# Patient Record
Sex: MALE | Race: WHITE | Employment: FULL TIME | URBAN - NONMETROPOLITAN AREA
[De-identification: names, ages, dates, MRNs, and addresses within clinical notes are randomized per-mention and may not be internally consistent; named-entity substitution may affect disease eponyms.]

---
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
## 2022-02-13 ENCOUNTER — HOSPITAL ENCOUNTER (EMERGENCY)
Facility: HOSPITAL | Age: 22
Discharge: HOME | End: 2022-02-13
Admitting: EMERGENCY MEDICINE

## 2022-02-13 ENCOUNTER — APPOINTMENT (OUTPATIENT)
Dept: GENERAL RADIOLOGY | Facility: HOSPITAL | Age: 22
End: 2022-02-13

## 2022-02-13 VITALS
HEART RATE: 88 BPM | HEIGHT: 73 IN | BODY MASS INDEX: 22 KG/M2 | WEIGHT: 166 LBS | DIASTOLIC BLOOD PRESSURE: 71 MMHG | SYSTOLIC BLOOD PRESSURE: 134 MMHG | RESPIRATION RATE: 16 BRPM | OXYGEN SATURATION: 99 % | TEMPERATURE: 99.5 F

## 2022-02-13 DIAGNOSIS — R04.2 HEMOPTYSIS: ICD-10-CM

## 2022-02-13 DIAGNOSIS — F12.10 MARIJUANA ABUSE: ICD-10-CM

## 2022-02-13 DIAGNOSIS — R05.9 COUGH: Primary | ICD-10-CM

## 2022-02-13 LAB
ALBUMIN SERPL-MCNC: 4.4 G/DL (ref 3.5–5.2)
ALBUMIN/GLOB SERPL: 1.7 G/DL
ALP SERPL-CCNC: 69 U/L (ref 39–117)
ALT SERPL W P-5'-P-CCNC: 13 U/L (ref 1–41)
AMPHET+METHAMPHET UR QL: NEGATIVE
AMPHETAMINES UR QL: NEGATIVE
ANION GAP SERPL CALCULATED.3IONS-SCNC: 10 MMOL/L (ref 5–15)
AST SERPL-CCNC: 15 U/L (ref 1–40)
BARBITURATES UR QL SCN: NEGATIVE
BASOPHILS # BLD AUTO: 0.06 10*3/MM3 (ref 0–0.2)
BASOPHILS NFR BLD AUTO: 0.7 % (ref 0–1.5)
BENZODIAZ UR QL SCN: NEGATIVE
BILIRUB SERPL-MCNC: 0.3 MG/DL (ref 0–1.2)
BUN SERPL-MCNC: 9 MG/DL (ref 6–20)
BUN/CREAT SERPL: 11.4 (ref 7–25)
BUPRENORPHINE SERPL-MCNC: NEGATIVE NG/ML
CALCIUM SPEC-SCNC: 9.1 MG/DL (ref 8.6–10.5)
CANNABINOIDS SERPL QL: POSITIVE
CHLORIDE SERPL-SCNC: 104 MMOL/L (ref 98–107)
CO2 SERPL-SCNC: 26 MMOL/L (ref 22–29)
COCAINE UR QL: NEGATIVE
CREAT SERPL-MCNC: 0.79 MG/DL (ref 0.76–1.27)
DEPRECATED RDW RBC AUTO: 39.2 FL (ref 37–54)
EOSINOPHIL # BLD AUTO: 0.36 10*3/MM3 (ref 0–0.4)
EOSINOPHIL NFR BLD AUTO: 3.9 % (ref 0.3–6.2)
ERYTHROCYTE [DISTWIDTH] IN BLOOD BY AUTOMATED COUNT: 12.3 % (ref 12.3–15.4)
GFR SERPL CREATININE-BSD FRML MDRD: 125 ML/MIN/1.73
GLOBULIN UR ELPH-MCNC: 2.6 GM/DL
GLUCOSE SERPL-MCNC: 89 MG/DL (ref 65–99)
HCT VFR BLD AUTO: 45.1 % (ref 37.5–51)
HGB BLD-MCNC: 15.3 G/DL (ref 13–17.7)
IMM GRANULOCYTES # BLD AUTO: 0.04 10*3/MM3 (ref 0–0.05)
IMM GRANULOCYTES NFR BLD AUTO: 0.4 % (ref 0–0.5)
INR PPP: 1.08 (ref 0.91–1.09)
LYMPHOCYTES # BLD AUTO: 1.77 10*3/MM3 (ref 0.7–3.1)
LYMPHOCYTES NFR BLD AUTO: 19.3 % (ref 19.6–45.3)
MCH RBC QN AUTO: 29.6 PG (ref 26.6–33)
MCHC RBC AUTO-ENTMCNC: 33.9 G/DL (ref 31.5–35.7)
MCV RBC AUTO: 87.2 FL (ref 79–97)
METHADONE UR QL SCN: NEGATIVE
MONOCYTES # BLD AUTO: 0.55 10*3/MM3 (ref 0.1–0.9)
MONOCYTES NFR BLD AUTO: 6 % (ref 5–12)
NEUTROPHILS NFR BLD AUTO: 6.39 10*3/MM3 (ref 1.7–7)
NEUTROPHILS NFR BLD AUTO: 69.7 % (ref 42.7–76)
NRBC BLD AUTO-RTO: 0 /100 WBC (ref 0–0.2)
OPIATES UR QL: NEGATIVE
OXYCODONE UR QL SCN: NEGATIVE
PCP UR QL SCN: NEGATIVE
PLATELET # BLD AUTO: 226 10*3/MM3 (ref 140–450)
PMV BLD AUTO: 10.6 FL (ref 6–12)
POTASSIUM SERPL-SCNC: 3.8 MMOL/L (ref 3.5–5.2)
PROPOXYPH UR QL: NEGATIVE
PROT SERPL-MCNC: 7 G/DL (ref 6–8.5)
PROTHROMBIN TIME: 13.6 SECONDS (ref 11.9–14.6)
RBC # BLD AUTO: 5.17 10*6/MM3 (ref 4.14–5.8)
SARS-COV-2 RNA PNL SPEC NAA+PROBE: NOT DETECTED
SODIUM SERPL-SCNC: 140 MMOL/L (ref 136–145)
TRICYCLICS UR QL SCN: NEGATIVE
WBC NRBC COR # BLD: 9.17 10*3/MM3 (ref 3.4–10.8)

## 2022-02-13 PROCEDURE — 99283 EMERGENCY DEPT VISIT LOW MDM: CPT

## 2022-02-13 PROCEDURE — 85025 COMPLETE CBC W/AUTO DIFF WBC: CPT | Performed by: NURSE PRACTITIONER

## 2022-02-13 PROCEDURE — 87635 SARS-COV-2 COVID-19 AMP PRB: CPT | Performed by: NURSE PRACTITIONER

## 2022-02-13 PROCEDURE — 80306 DRUG TEST PRSMV INSTRMNT: CPT | Performed by: NURSE PRACTITIONER

## 2022-02-13 PROCEDURE — 80053 COMPREHEN METABOLIC PANEL: CPT | Performed by: NURSE PRACTITIONER

## 2022-02-13 PROCEDURE — 85610 PROTHROMBIN TIME: CPT | Performed by: NURSE PRACTITIONER

## 2022-02-13 PROCEDURE — 71045 X-RAY EXAM CHEST 1 VIEW: CPT

## 2022-02-13 RX ORDER — SODIUM CHLORIDE 0.9 % (FLUSH) 0.9 %
10 SYRINGE (ML) INJECTION AS NEEDED
Status: DISCONTINUED | OUTPATIENT
Start: 2022-02-13 | End: 2022-02-13 | Stop reason: HOSPADM

## 2022-02-13 NOTE — DISCHARGE INSTRUCTIONS
Return to ER if symptoms worsen   Stop smoking marijuana and vaping  Follow up with one of the [facility] physician groups below to setup primary care. If you have trouble making an appointment, please call the [facility] Nurse Line at [phone]    Dr. Rae Morelos DO, Dr. Jenelle Oleary DO, and ANTONIO Zelaya  [facility] Primary Care  [address], [place], [address] [phone]    Dr. Loy Dodge MD  [facility] Internal Medicine - [address], [place], [place] [address] [phone]    Dr. David Stanford DO, Dr. Elmo Hernandez DO,  ANTONIO Stahl, and ANTONIO Cruz  [facility] Family & Internal Medicine - [address], [place], [place] [address] [phone]     Dr. Sabine Lemus MD, and ANTONIO Espana  [facility] Family Medicine [address], [place], [place] [address] [phone]    Dr. Robert Johnson MD and Dr. Sanjiv Obregon MD  [facility]  [address] [place] [address], [address]  [phone]    Dr. Joe Reid MD  [facility] Family Medicine [facility]  [address] [facility] [address], [address], [place], [place], [address] [phone]    Dr. Ronni Ricardo MD  [facility] Family Medicine [facility]  [address], [place], [address] [phone]

## 2022-02-13 NOTE — ED PROVIDER NOTES
Subjective   Patient is a 21-year-old white male presents to emergency department with complaints of cough with some hemoptysis that started today.  He states his been coughing up dark brown sputum for the past month.  He states today he was coughing and noticed his sputum had blood-tinged in the sputum.  He smokes marijuana at least 3-7 times a day.  States he does not smoke cigarettes.  He states he does vape occasionally.  He denies any chest pain or shortness of breath.  No nausea or vomiting.  No fever or chills.  No weight loss.  He states he only coughed up blood-tinged sputum once.      History provided by:  Patient   used: No        Review of Systems   Constitutional: Negative.    HENT: Negative.    Eyes: Negative.    Respiratory:        Patient is a 21-year-old white male presents to emergency department with complaints of cough with some hemoptysis that started today.  He states his been coughing up dark brown sputum for the past month.  He states today he was coughing and noticed his sputum had blood-tinged in the sputum.  He smokes marijuana at least 3-7 times a day.  States he does not smoke cigarettes.  He states he does vape occasionally.  He denies any chest pain or shortness of breath.  No nausea or vomiting.  No fever or chills.  No weight loss.  He states he only coughed up blood-tinged sputum once.     Cardiovascular: Negative.    Gastrointestinal: Negative.    Endocrine: Negative.    Genitourinary: Negative.    Musculoskeletal: Negative.    Skin: Negative.    Allergic/Immunologic: Negative.    Neurological: Negative.    Hematological: Negative.    Psychiatric/Behavioral: Negative.    All other systems reviewed and are negative.      History reviewed. No pertinent past medical history.    No Known Allergies    History reviewed. No pertinent surgical history.    History reviewed. No pertinent family history.    Social History     Socioeconomic History   • Marital status:  "Single   Tobacco Use   • Smoking status: Never Smoker   • Smokeless tobacco: Never Used   Substance and Sexual Activity   • Alcohol use: Yes   • Drug use: Yes     Types: Marijuana       Prior to Admission medications    Not on File       /71 (BP Location: Right arm, Patient Position: Sitting)   Pulse 88   Temp 99.5 °F (37.5 °C) (Oral)   Resp 16   Ht 184.2 cm (72.5\")   Wt 75.3 kg (166 lb)   SpO2 99%   BMI 22.20 kg/m²     Objective   Physical Exam  Vitals and nursing note reviewed.   Constitutional:       Appearance: He is well-developed.      Comments: Respirations are even and unlabored.  There is no acute distress.  Patient is nontoxic-appearing.   HENT:      Head: Normocephalic and atraumatic.   Eyes:      Conjunctiva/sclera: Conjunctivae normal.      Pupils: Pupils are equal, round, and reactive to light.   Cardiovascular:      Rate and Rhythm: Normal rate and regular rhythm.      Heart sounds: Normal heart sounds.   Pulmonary:      Effort: Pulmonary effort is normal.      Breath sounds: Normal breath sounds.      Comments: Lungs are clear to auscultation.  Abdominal:      General: Bowel sounds are normal.      Palpations: Abdomen is soft.   Musculoskeletal:         General: Normal range of motion.      Cervical back: Normal range of motion and neck supple.   Skin:     General: Skin is warm and dry.   Neurological:      Mental Status: He is alert and oriented to person, place, and time.      Deep Tendon Reflexes: Reflexes are normal and symmetric.   Psychiatric:         Behavior: Behavior normal.         Thought Content: Thought content normal.         Judgment: Judgment normal.         Procedures         Lab Results (last 24 hours)     Procedure Component Value Units Date/Time    CBC & Differential [[id number]]  (Abnormal) Collected: 02/13/22 1600    Specimen: Blood Updated: 02/13/22 1616    Narrative:      The following orders were created for panel order CBC & Differential.  Procedure               "                 Abnormality         Status                     ---------                               -----------         ------                     CBC Auto Differential[[id number]]        Abnormal            Final result                 Please view results for these tests on the individual orders.    Comprehensive Metabolic Panel [[id number]] Collected: 02/13/22 1600    Specimen: Blood Updated: 02/13/22 1633     Glucose 89 mg/dL      BUN 9 mg/dL      Creatinine 0.79 mg/dL      Sodium 140 mmol/L      Potassium 3.8 mmol/L      Chloride 104 mmol/L      CO2 26.0 mmol/L      Calcium 9.1 mg/dL      Total Protein 7.0 g/dL      Albumin 4.40 g/dL      ALT (SGPT) 13 U/L      AST (SGOT) 15 U/L      Alkaline Phosphatase 69 U/L      Total Bilirubin 0.3 mg/dL      eGFR Non African Amer 125 mL/min/1.73      Globulin 2.6 gm/dL      A/G Ratio 1.7 g/dL      BUN/Creatinine Ratio 11.4     Anion Gap 10.0 mmol/L     Narrative:      GFR Normal >60  Chronic Kidney Disease <60  Kidney Failure <15      Protime-INR [[id number]]  (Normal) Collected: 02/13/22 1600    Specimen: Blood Updated: 02/13/22 1622     Protime 13.6 Seconds      INR 1.08    Urine Drug Screen - Urine, Clean Catch [[id number]]  (Abnormal) Collected: 02/13/22 1600    Specimen: Urine, Clean Catch Updated: 02/13/22 1642     THC, Screen, Urine Positive     Phencyclidine (PCP), Urine Negative     Cocaine Screen, Urine Negative     Methamphetamine, Ur Negative     Opiate Screen Negative     Amphetamine Screen, Urine Negative     Benzodiazepine Screen, Urine Negative     Tricyclic Antidepressants Screen Negative     Methadone Screen, Urine Negative     Barbiturates Screen, Urine Negative     Oxycodone Screen, Urine Negative     Propoxyphene Screen Negative     Buprenorphine, Screen, Urine Negative    Narrative:      Cutoff For Drugs Screened:    Amphetamines               500 ng/ml  Barbiturates               200 ng/ml  Benzodiazepines            150 ng/ml  Cocaine                     150 ng/ml  Methadone                  200 ng/ml  Opiates                    100 ng/ml  Phencyclidine               25 ng/ml  THC                            50 ng/ml  Methamphetamine            500 ng/ml  Tricyclic Antidepressants  300 ng/ml  Oxycodone                  100 ng/ml  Propoxyphene               300 ng/ml  Buprenorphine               10 ng/ml    The normal value for all drugs tested is negative. This report includes unconfirmed screening results, with the cutoff values listed, to be used for medical treatment purposes only.  Unconfirmed results must not be used for non-medical purposes such as employment or legal testing.  Clinical consideration should be applied to any drug of abuse test, particularly when unconfirmed results are used.      COVID PRE-OP / PRE-PROCEDURE SCREENING ORDER (NO ISOLATION) - Swab, Nasal Cavity [[id number]]  (Normal) Collected: 02/13/22 1600    Specimen: Swab from Nasal Cavity Updated: 02/13/22 1739    Narrative:      The following orders were created for panel order COVID PRE-OP / PRE-PROCEDURE SCREENING ORDER (NO ISOLATION) - Swab, Nasal Cavity.  Procedure                               Abnormality         Status                     ---------                               -----------         ------                     COVID-19,[facility]...[[id number]]  Normal              Final result                 Please view results for these tests on the individual orders.    CBC Auto Differential [[id number]]  (Abnormal) Collected: 02/13/22 1600    Specimen: Blood Updated: 02/13/22 1616     WBC 9.17 10*3/mm3      RBC 5.17 10*6/mm3      Hemoglobin 15.3 g/dL      Hematocrit 45.1 %      MCV 87.2 fL      MCH 29.6 pg      MCHC 33.9 g/dL      RDW 12.3 %      RDW-SD 39.2 fl      MPV 10.6 fL      Platelets 226 10*3/mm3      Neutrophil % 69.7 %      Lymphocyte % 19.3 %      Monocyte % 6.0 %      Eosinophil % 3.9 %      Basophil % 0.7 %      Immature Grans % 0.4 %      Neutrophils, Absolute  6.39 10*3/mm3      Lymphocytes, Absolute 1.77 10*3/mm3      Monocytes, Absolute 0.55 10*3/mm3      Eosinophils, Absolute 0.36 10*3/mm3      Basophils, Absolute 0.06 10*3/mm3      Immature Grans, Absolute 0.04 10*3/mm3      nRBC 0.0 /100 WBC     COVID-19,[facility] IN-HOUSE,Nasal Swab No Transport Media 3-4 HR TAT - Swab, Nasal Cavity [[id number]]  (Normal) Collected: 02/13/22 1600    Specimen: Swab from Nasal Cavity Updated: 02/13/22 1739     COVID19 Not Detected    Narrative:      Fact sheet for providers: https://www.fda.gov/media/822886/download     Fact sheet for patients: https://www.[employer].com.gov/media/557723/download    Test performed by PCR.    Consider negative results in combination with clinical observations, patient history, and epidemiological information.          XR Chest 1 View   Final Result       No acute findings.   This report was finalized on 02/13/2022 16:11 by Dr. Ramirez Doss MD.          ED Course  ED Course as of 02/14/22 1350   Sun Feb 13, 2022   1748 Patient has had no hemoptysis while he is in the emergency department.  His laboratory studies are unremarkable.  Chest x-ray is negative for any acute pneumonia or abnormal findings.  Covid 19 swab is negative.  As noted earlier the patient smokes marijuana about 7 times a day.  He also vapes as well.  Advised the patient most likely this is the reason for his hemoptysis and he needs to decrease his smoking.  He does not have a primary care provider.  Will provide him a list of primary care providers to follow-up with.  Advised him to return if he has shortness of breath, fever, chest pain.  Patient will be discharged shortly in stable condition. [CW]      ED Course User Index  [CW] Yohana Jones, ANTONIO          MDM  Number of Diagnoses or Management Options  Cough: minor  Hemoptysis: minor  Marijuana abuse: minor     Amount and/or Complexity of Data Reviewed  Clinical lab tests: ordered and reviewed  Tests in the radiology section of CPT®:  ordered and reviewed    Patient Progress  Patient progress: stable      Final diagnoses:   Cough   Marijuana abuse   Hemoptysis          Yohana Jones, APRN  02/14/22 8506

## 2024-03-19 ENCOUNTER — HOSPITAL ENCOUNTER (EMERGENCY)
Facility: HOSPITAL | Age: 24
Discharge: HOME | End: 2024-03-19
Attending: STUDENT IN AN ORGANIZED HEALTH CARE EDUCATION/TRAINING PROGRAM | Admitting: STUDENT IN AN ORGANIZED HEALTH CARE EDUCATION/TRAINING PROGRAM
Payer: COMMERCIAL

## 2024-03-19 VITALS
RESPIRATION RATE: 16 BRPM | SYSTOLIC BLOOD PRESSURE: 132 MMHG | BODY MASS INDEX: 23.05 KG/M2 | TEMPERATURE: 97.8 F | HEIGHT: 70 IN | WEIGHT: 161 LBS | OXYGEN SATURATION: 99 % | DIASTOLIC BLOOD PRESSURE: 80 MMHG | HEART RATE: 88 BPM

## 2024-03-19 DIAGNOSIS — R05.9 COUGH, UNSPECIFIED TYPE: Primary | ICD-10-CM

## 2024-03-19 LAB
FLUAV RNA RESP QL NAA+PROBE: NOT DETECTED
FLUBV RNA RESP QL NAA+PROBE: NOT DETECTED
SARS-COV-2 RNA RESP QL NAA+PROBE: NOT DETECTED

## 2024-03-19 PROCEDURE — 99283 EMERGENCY DEPT VISIT LOW MDM: CPT

## 2024-03-19 PROCEDURE — 87636 SARSCOV2 & INF A&B AMP PRB: CPT | Performed by: STUDENT IN AN ORGANIZED HEALTH CARE EDUCATION/TRAINING PROGRAM

## 2024-03-19 RX ORDER — OXYMETAZOLINE HYDROCHLORIDE 0.05 G/100ML
2 SPRAY NASAL 2 TIMES DAILY
Qty: 6 ML | Refills: 0 | Status: SHIPPED | OUTPATIENT
Start: 2024-03-19 | End: 2024-03-22

## 2024-03-19 NOTE — DISCHARGE INSTRUCTIONS
Your swabs today were normal.  Please come back for chest pain, passing out, trouble breathing, or consider seeing a doctor if you develop fever or continue to feel unwell.  You can try Afrin nasal spray to help you breathe through your nose for the next few days.

## 2024-03-19 NOTE — ED PROVIDER NOTES
Subjective   History of Present Illness  Patient presents due to cough and congestion.  Present for 2 days.  No fevers.  No sore throat.  Eating and drinking normally.  No trouble breathing.  His nose gets stopped up.  No known sick contacts.  No past medical history.  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  He notes that he did not go to work last night and will need a work note to be excused.    Review of Systems   Constitutional:  Negative for chills and fever.   HENT:  Positive for congestion.    Respiratory:  Positive for cough. Negative for shortness of breath.    Cardiovascular:  Negative for chest pain and palpitations.   Gastrointestinal:  Negative for abdominal pain and vomiting.   Genitourinary:  Negative for difficulty urinating and dysuria.   Neurological:  Negative for syncope and light-headedness.       History reviewed. No pertinent past medical history.    No Known Allergies    History reviewed. No pertinent surgical history.    History reviewed. No pertinent family history.    Social History     Socioeconomic History    Marital status: Single   Tobacco Use    Smoking status: Never    Smokeless tobacco: Never   Substance and Sexual Activity    Alcohol use: Yes    Drug use: Yes     Types: Marijuana           Objective   Physical Exam  Vitals reviewed.   Constitutional:       General: He is not in acute distress.  HENT:      Head: Normocephalic and atraumatic.      Comments: No focal intraoral swelling.  No uvular deviation.  No posterior pharyngeal erythema or exudate.  No tonsillar exudate or focal tonsillar swelling.  Intraoral exam overall unremarkable.  Eyes:      Extraocular Movements: Extraocular movements intact.      Conjunctiva/sclera: Conjunctivae normal.   Cardiovascular:      Pulses: Normal pulses.      Heart sounds: Normal heart sounds.   Pulmonary:      Effort: Pulmonary effort is normal. No respiratory distress.      Breath sounds:  Normal breath sounds. No wheezing.   Abdominal:      General: Abdomen is flat. There is no distension.      Tenderness: There is no abdominal tenderness. There is no guarding.   Musculoskeletal:      Cervical back: Normal range of motion and neck supple.   Skin:     General: Skin is warm and dry.   Neurological:      General: No focal deficit present.      Mental Status: He is alert. Mental status is at baseline.   Psychiatric:         Behavior: Behavior normal.         Thought Content: Thought content normal.         Procedures           ED Course                                             Medical Decision Making  Problems Addressed:  Cough, unspecified type: acute illness or injury    Risk  OTC drugs.      Jamarcus Howard is a 23 y.o. male with PMH above who presents to the Emergency Department with cough, congestion.  URI sx vs seasonal rhinitis. COVID/Flu swabs negative.  Not consistent with strep.  Lung sounds clear, no fever, does not need chest x-ray at this time.  Discussed Afrin use, outpatient follow-up and return precautions.      Final diagnosis: URI    All questions answered. Patient/family was understanding and in agreement with today's assessment and plan. The patient was monitored during their stay in the ED and dispositioned without acute event.    Electronically signed by:  Sav Andrews MD 3/19/2024 08:18 CDT      Note: Dragon medical dictation software was used in the creation of this note.        Final diagnoses:   Cough, unspecified type       ED Disposition  ED Disposition       ED Disposition   Discharge    Condition   Stable    Comment   --               No follow-up provider specified.       Medication List        New Prescriptions      oxymetazoline 0.05 % nasal spray  Commonly known as: AFRIN  2 sprays into the nostril(s) as directed by provider 2 (Two) Times a Day for 3 days.               Where to Get Your Medications        These medications were sent to [facility]  #[id number] - [place], [place] - [address] AT [address] - [phone]  - [phone]   [address], [place] [address]      Phone: [phone]   oxymetazoline 0.05 % nasal spray            Sav Andrews MD  03/19/24 0818

## 2024-03-19 NOTE — Clinical Note
[facility] EMERGENCY DEPARTMENT  [address]  [facility] [address]  Phone: [phone]    Jamarcus Howard was seen and treated in our emergency department on 3/19/2024.  He may return to work on 03/20/2024.         Thank you for choosing [facility].    Sav Andrews MD

## 2024-03-19 NOTE — Clinical Note
[facility] EMERGENCY DEPARTMENT  [address]  [facility] [address]  Phone: [phone]    Jamarcus Howard was seen and treated in our emergency department on 3/19/2024.  He may return to work on 03/20/2024.         Thank you for choosing [facility].    Sav Andrews MD